# Patient Record
Sex: MALE | Race: WHITE | NOT HISPANIC OR LATINO | Employment: UNEMPLOYED | URBAN - METROPOLITAN AREA
[De-identification: names, ages, dates, MRNs, and addresses within clinical notes are randomized per-mention and may not be internally consistent; named-entity substitution may affect disease eponyms.]

---

## 2017-09-06 ENCOUNTER — APPOINTMENT (OUTPATIENT)
Dept: GENERAL RADIOLOGY | Facility: HOSPITAL | Age: 32
End: 2017-09-06

## 2017-09-06 ENCOUNTER — HOSPITAL ENCOUNTER (EMERGENCY)
Facility: HOSPITAL | Age: 32
Discharge: HOME | End: 2017-09-06
Attending: FAMILY MEDICINE | Admitting: FAMILY MEDICINE

## 2017-09-06 VITALS
RESPIRATION RATE: 14 BRPM | HEART RATE: 92 BPM | OXYGEN SATURATION: 99 % | HEIGHT: 72 IN | BODY MASS INDEX: 20.59 KG/M2 | DIASTOLIC BLOOD PRESSURE: 88 MMHG | TEMPERATURE: 97.7 F | SYSTOLIC BLOOD PRESSURE: 144 MMHG | WEIGHT: 152 LBS

## 2017-09-06 DIAGNOSIS — S60.221A CONTUSION OF RIGHT HAND, INITIAL ENCOUNTER: Primary | ICD-10-CM

## 2017-09-06 PROCEDURE — 73130 X-RAY EXAM OF HAND: CPT

## 2017-09-06 PROCEDURE — 99283 EMERGENCY DEPT VISIT LOW MDM: CPT

## 2017-09-06 NOTE — ED PROVIDER NOTES
EMERGENCY DEPARTMENT ENCOUNTER    CHIEF COMPLAINT  Chief Complaint: R arm pain and swelling  History given by: Pt  History limited by: N/A  Room Number: HALE/E  PMD: No Known Provider      HPI:  Pt is a 31 y.o. male who presents complaining of pain and swelling to the R hand after punching a stainless steel fridge last night. Pt reports his last tetanus shot was 3 years ago. There was no treatment prior to arrival.      Duration:  1 day ago  Onset: gradual  Timing: constant  Location: R hand  Radiation: none  Quality: pain, swelling  Intensity/Severity: moderate  Progression: worsened  Associated Symptoms: none  Aggravating Factors: none  Alleviating Factors: none  Previous Episodes: No prior fractures to the R hand.   Treatment before arrival: No treatment prior to arrival.     PAST MEDICAL HISTORY  Active Ambulatory Problems     Diagnosis Date Noted   • No Active Ambulatory Problems     Resolved Ambulatory Problems     Diagnosis Date Noted   • No Resolved Ambulatory Problems     Past Medical History:   Diagnosis Date   • Abdominal pain    • Alcoholism    • Anxiety    • Crohn's disease    • Diarrhea    • Diverticulitis of colon    • Gastroenteritis    • Leukocytosis    • MRSA (methicillin resistant Staphylococcus aureus)    • MRSA infection    • Vomiting        PAST SURGICAL HISTORY  Past Surgical History:   Procedure Laterality Date   • COLONOSCOPY  2013 approx    benign polyps per patient   • ENDOSCOPY     • ENDOSCOPY  2013    normal per patient   • TONSILLECTOMY         FAMILY HISTORY  Family History   Problem Relation Age of Onset   • Cancer Mother      uterine   • Cancer Maternal Aunt      uterine   • Colon cancer Maternal Grandfather        SOCIAL HISTORY  Social History     Social History   • Marital status: Single     Spouse name: N/A   • Number of children: N/A   • Years of education: N/A     Occupational History   • Not on file.     Social History Main Topics   • Smoking status: Heavy Tobacco Smoker      Packs/day: 1.50   • Smokeless tobacco: Not on file   • Alcohol use Yes      Comment: social   • Drug use: Yes     Special: Marijuana   • Sexual activity: Defer     Other Topics Concern   • Not on file     Social History Narrative       ALLERGIES  Review of patient's allergies indicates no known allergies.    REVIEW OF SYSTEMS  Review of Systems   Constitutional: Negative for activity change, appetite change and fever.   HENT: Negative for congestion and sore throat.    Eyes: Negative.    Respiratory: Negative for cough and shortness of breath.    Cardiovascular: Negative for chest pain and leg swelling.   Gastrointestinal: Negative for abdominal pain, diarrhea and vomiting.   Endocrine: Negative.    Genitourinary: Negative for decreased urine volume and dysuria.   Musculoskeletal: Positive for arthralgias (R hand, with swelling). Negative for neck pain.   Skin: Negative for rash and wound.   Allergic/Immunologic: Negative.    Neurological: Negative for weakness, numbness and headaches.   Hematological: Negative.    Psychiatric/Behavioral: Negative.    All other systems reviewed and are negative.      PHYSICAL EXAM  ED Triage Vitals   Temp Heart Rate Resp BP SpO2   09/06/17 1348 09/06/17 1348 09/06/17 1348 09/06/17 1351 09/06/17 1348   98.2 °F (36.8 °C) 109 16 149/105 99 %      Temp src Heart Rate Source Patient Position BP Location FiO2 (%)   09/06/17 1348 -- 09/06/17 1351 09/06/17 1351 --   Tympanic  Standing Right arm        Physical Exam   Constitutional: No distress.   HENT:   Head: Normocephalic and atraumatic.   Eyes: EOM are normal.   Neck: Normal range of motion.   Cardiovascular: Normal heart sounds.    Pulmonary/Chest: No respiratory distress.   Abdominal: There is no tenderness.   Musculoskeletal: He exhibits no edema.        Right hand: He exhibits decreased range of motion (mildly limited, painful) and tenderness.   Swelling over 3rd, 4th, 5th metacarpals, with bruising, but no distal neurovascular  deficits.    Neurological: He is alert.   Skin: Skin is warm and dry.   Nursing note and vitals reviewed.      LAB RESULTS  Lab Results (last 24 hours)     ** No results found for the last 24 hours. **          I ordered the above labs and reviewed the results    RADIOLOGY  XR Hand 3+ View Right   Final Result   FINDINGS:  There is some minimal deformity near the base of the 5th  metacarpal that may relate to old healed fracture. No definite acute  fracture is seen.     This report was finalized on 9/6/2017 5:41 PM by Dr. Ilir Matta MD.        I ordered the above noted radiological studies. Interpreted by radiologist. Reviewed by me in PACS.       PROCEDURES  Procedures          PROGRESS AND CONSULTS  ED Course     1358  Ordered XR hand for further evaluation.   1814  Ordered a 2 inch Ace wrap for the R Hand.   1810   I informed pt his XR showed there were no fractures. We discussed plan to discharge home with instructions to use ice to decrease inflammation. Pt understands and agrees with plan. All questions addressed.        MEDICAL DECISION MAKING  Results were reviewed/discussed with the patient and they were also made aware of online access. Pt also made aware that some labs, such as cultures, will not be resulted during ER visit and follow up with PMD is necessary.     MDM  Number of Diagnoses or Management Options     Amount and/or Complexity of Data Reviewed  Tests in the radiology section of CPT®: ordered and reviewed (XR R hand - showed no fractures. )  Independent visualization of images, tracings, or specimens: yes    Patient Progress  Patient progress: stable         DIAGNOSIS  Final diagnoses:   Contusion of right hand, initial encounter       DISPOSITION  DISCHARGE    Patient discharged in stable condition.    Reviewed implications of results, diagnosis, meds, responsibility to follow up, warning signs and symptoms of possible worsening, potential complications and reasons to return to  ER.    Patient/Family voiced understanding of above instructions.    Discussed plan for discharge, as there is no emergent indication for admission.  Pt/family is agreeable and understands need for follow up and repeat testing.  Pt is aware that discharge does not mean that nothing is wrong but it indicates no emergency is present that requires admission and they must continue care with follow-up as given below or physician of their choice.     FOLLOW-UP  No Known Provider  [facility] [id number]      See your PCP as needed         Medication List      Stop          chlorhexidine 4 % external liquid   Commonly known as:  HIBICLENS       hyoscyamine 0.125 MG SL tablet   Commonly known as:  LEVSIN       ibuprofen 800 MG tablet   Commonly known as:  ADVIL,MOTRIN       metroNIDAZOLE 500 MG tablet   Commonly known as:  FLAGYL       mupirocin 2 % ointment   Commonly known as:  BACTROBAN       oxyCODONE-acetaminophen 7.5-325 MG per tablet   Commonly known as:  PERCOCET       promethazine 12.5 MG tablet   Commonly known as:  PHENERGAN               Latest Documented Vital Signs:  As of 10:39 PM  BP- 144/88 HR- 92 Temp- 97.7 °F (36.5 °C) (Tympanic) O2 sat- 99%    --  Documentation assistance provided by joshua Murray for Dr. Doug Servin MD.  Information recorded by the scribe was done at my direction and has been verified and validated by me.     Fang Apodaca  09/06/17 2674       Doug Servin MD  09/06/17 9153